# Patient Record
Sex: FEMALE | Race: WHITE | NOT HISPANIC OR LATINO | Employment: OTHER | URBAN - METROPOLITAN AREA
[De-identification: names, ages, dates, MRNs, and addresses within clinical notes are randomized per-mention and may not be internally consistent; named-entity substitution may affect disease eponyms.]

---

## 2018-05-16 ENCOUNTER — TELEPHONE (OUTPATIENT)
Dept: NEUROLOGY | Facility: CLINIC | Age: 63
End: 2018-05-16

## 2018-05-16 NOTE — TELEPHONE ENCOUNTER
Received direct notification via EPIC from [facility] [place], [place] that pt was admitted  Pt last seen by Dr Gaye Van 3/2014  I see pt has appt w/ [facility] [place] [place] Neuro 7/28  Unable to leave message (continuous ringing) to verify if pt requesting to be seen in our office

## 2019-08-03 ENCOUNTER — APPOINTMENT (EMERGENCY)
Dept: RADIOLOGY | Facility: HOSPITAL | Age: 64
End: 2019-08-03
Payer: MEDICARE

## 2019-08-03 ENCOUNTER — HOSPITAL ENCOUNTER (EMERGENCY)
Facility: HOSPITAL | Age: 64
Discharge: HOME | End: 2019-08-03
Attending: EMERGENCY MEDICINE | Admitting: EMERGENCY MEDICINE
Payer: MEDICARE

## 2019-08-03 VITALS
OXYGEN SATURATION: 97 % | BODY MASS INDEX: 19.14 KG/M2 | RESPIRATION RATE: 16 BRPM | WEIGHT: 115 LBS | DIASTOLIC BLOOD PRESSURE: 67 MMHG | HEART RATE: 73 BPM | SYSTOLIC BLOOD PRESSURE: 146 MMHG | TEMPERATURE: 97.4 F

## 2019-08-03 DIAGNOSIS — S97.112A CRUSHING INJURY OF LEFT GREAT TOE, INITIAL ENCOUNTER: Primary | ICD-10-CM

## 2019-08-03 PROCEDURE — 99283 EMERGENCY DEPT VISIT LOW MDM: CPT | Performed by: PHYSICIAN ASSISTANT

## 2019-08-03 PROCEDURE — 73660 X-RAY EXAM OF TOE(S): CPT

## 2019-08-03 PROCEDURE — NS001 PR NO SIGNATURE OR ATTESTATION: Performed by: PODIATRIST

## 2019-08-03 PROCEDURE — 99283 EMERGENCY DEPT VISIT LOW MDM: CPT

## 2019-08-03 PROCEDURE — 90471 IMMUNIZATION ADMIN: CPT

## 2019-08-03 PROCEDURE — 90715 TDAP VACCINE 7 YRS/> IM: CPT | Performed by: PHYSICIAN ASSISTANT

## 2019-08-03 RX ORDER — CEPHALEXIN 500 MG/1
500 CAPSULE ORAL EVERY 6 HOURS SCHEDULED
Qty: 28 CAPSULE | Refills: 0 | Status: SHIPPED | OUTPATIENT
Start: 2019-08-03 | End: 2019-08-10

## 2019-08-03 RX ORDER — BACITRACIN, NEOMYCIN, POLYMYXIN B 400; 3.5; 5 [USP'U]/G; MG/G; [USP'U]/G
1 OINTMENT TOPICAL ONCE
Status: COMPLETED | OUTPATIENT
Start: 2019-08-03 | End: 2019-08-03

## 2019-08-03 RX ORDER — LIDOCAINE HYDROCHLORIDE 10 MG/ML
10 INJECTION, SOLUTION EPIDURAL; INFILTRATION; INTRACAUDAL; PERINEURAL ONCE
Status: COMPLETED | OUTPATIENT
Start: 2019-08-03 | End: 2019-08-03

## 2019-08-03 RX ADMIN — LIDOCAINE HYDROCHLORIDE 10 ML: 10 INJECTION, SOLUTION EPIDURAL; INFILTRATION; INTRACAUDAL; PERINEURAL at 17:42

## 2019-08-03 RX ADMIN — BACITRACIN ZINC, NEOMYCIN, POLYMYXIN B 1 SMALL APPLICATION: 400; 3.5; 5 OINTMENT TOPICAL at 17:42

## 2019-08-03 RX ADMIN — TETANUS TOXOID, REDUCED DIPHTHERIA TOXOID AND ACELLULAR PERTUSSIS VACCINE, ADSORBED 0.5 ML: 5; 2.5; 8; 8; 2.5 SUSPENSION INTRAMUSCULAR at 16:30

## 2019-08-03 NOTE — DISCHARGE INSTRUCTIONS
Nail Removal procedure aftercare:  1) Leave dressings on for 24 hours  2) Soak with epsom salt for 15-20 min per day for 7 days  3) Apply triple antibiotic ointment and bandaid to affected area for 7 days  4) Monitor for infection: pus (thick yellow drainage), redness tracking up the foot, fever, nausea, vomiting, fever, chills   If any of these issues arise, call clinic immediately or go to urgent care or emergency room to see provider

## 2019-08-03 NOTE — CONSULTS
Consult - Podiatry   Sara Burns 70 y o  female MRN: [id number]  Unit/Bed#: ED 02 Encounter: [id number]    Assessment/Plan     Assessment:  1  Contusion of Left hallucal nail with underlying hematoma  2  Onychodystrophy    Plan:  · Sharply debrided toenails x9 using a nail Nipper to normal length and thickness  Debridement was well tolerated without incident  · Performed bedside total nail avulsion of left hallucal nail  Patient was injected with 4 5 cc 1% Lidocaine plain  The entire left hallucal nail was removed from the underlying nail bed using forceps and hemostat without incident  Procedure was tolerated well  · No underlying nail bed laceration noted  · Dressed with triple antibiotic ointment, 4x4 gauze, and Coban  · Recommend PO Keflex 500mg qid for 7 days  · Pain management with OTC NSAIDs PRN pain  · Instructed patient to leave dressing intact for 24 hours  She may then soak the left foot in warm water with Epsom salt for 15 mins  to encourage healthy drainage  Reapply dressing consisting of triple antibiotic and gauze  · Please follow up as outpatient with Dr Albin Gonzalez in 2 weeks  · If any signs of acute infection noted, please return to ED immediately for evaluation  History of Present Illness     HPI:  Sara Burns is a 70 y o  female who presents with pain in the left hallux  She states that her left toe had been stepped on last Thursday leading to bleeding of the hallucal nail plate  She states that she was re-injured and that her children noted the hematoma surrounding the nail plate  She states that her bilateral hallucal nail plates have been dystrophic for a long period of time  Approximately 5 years ago she saw a podiatrist who recommended that she soak her bilateral feet in Listerine to help cure fungus  She was also told that her nails will fall off on their own, but they have not any continue to bother her    She states that when they become increasingly thickened they are susceptible to trauma and getting snagged on clothing articles  She denies experiencing acute signs of infection at the anterior hallucal nail or surrounding soft tissue  She denies nausea, vomiting, fever, chills, shortness of breath, chest pain  She is accompanied by her 2 sons and ambulates with assistance of a wheelchair  Consults  Review of Systems   Constitutional: Negative  HENT: Negative  Eyes: Negative  Respiratory: Negative  Cardiovascular: Negative  Gastrointestinal: Negative  Musculoskeletal:  Bilateral LE muscle weakness  Skin:  Elongated, thickened, dystrophic nails of hallux bilaterally  Neurological: Negative  Psych: Negative  Historical Information   Past Medical History:   Diagnosis Date    Multiple sclerosis ([facility] [address] )      History reviewed  No pertinent surgical history  Social History   Social History     Substance and Sexual Activity   Alcohol Use Not Currently    Frequency: Never     Social History     Substance and Sexual Activity   Drug Use Never     Social History     Tobacco Use   Smoking Status Never Smoker   Smokeless Tobacco Never Used     Family History: History reviewed  No pertinent family history      Meds/Allergies     (Not in a hospital admission)  No Known Allergies    Objective   First Vitals:   Blood Pressure: 146/67 (08/03/19 1518)  Pulse: 73 (08/03/19 1518)  Temperature: (!) 97 4 °F (36 3 °C) (08/03/19 1518)  Temp Source: Oral (08/03/19 1518)  Respirations: 16 (08/03/19 1518)  Weight - Scale: 52 2 kg (115 lb) (08/03/19 1518)  SpO2: 97 % (08/03/19 1518)    Current Vitals:   Blood Pressure: 146/67 (08/03/19 1518)  Pulse: 73 (08/03/19 1518)  Temperature: (!) 97 4 °F (36 3 °C) (08/03/19 1518)  Temp Source: Oral (08/03/19 1518)  Respirations: 16 (08/03/19 1518)  Weight - Scale: 52 2 kg (115 lb) (08/03/19 1518)  SpO2: 97 % (08/03/19 1518)        /67 (BP Location: Right arm)   Pulse 73   Temp (!) 97 4 °F (36 3 °C) (Oral) Resp 16   Wt 52 2 kg (115 lb)   SpO2 97%   BMI 19 14 kg/m²      General Appearance:    Alert, cooperative, no distress   Head:    Normocephalic, without obvious abnormality, atraumatic   Eyes:    PERRL, conjunctiva/corneas clear      Nose:   Moist mucous membranes   Neck:   Supple, symmetrical, trachea midline   Back:     Symmetric   Lungs:     Respirations unlabored   Heart:    Regular rate and rhythm   Abdomen:     Soft, non-tender   Extremities:   MMT is 1/5 to all compartments of the LE, +0/4 edema B/L, No pain on palpation noted bilaterally  No calf tenderness noted bilaterally  Pulses:   Pedal pulses are 2/4 B/l, CFT< 3sec to all digits  Pedal hair is Absent   Skin:   The left hallucal nail is thickened and dystrophic in nature  Hematoma surrounds medial and lateral nail margins  There is onycholysis of the proximal nail plate near the eponychium  There is underlying hematoma on the nail bed present  There is mild tenderness to direct palpation of the left ankle nail plate  The right hallucal nail is elongated, dystrophic, and yellow brown in color  Neurologic:   Gross sensation is Intact  Protective sensation is Intact  Left      Lab, Imaging and other studies:   I have personally reviewed pertinent lab results  , CBC: No results found for: WBC, HGB, HCT, MCV, PLT, ADJUSTEDWBC, MCH, MCHC, RDW, MPV, NRBC, CMP: No results found for: SODIUM, K, CL, CO2, ANIONGAP, BUN, CREATININE, GLUCOSE, CALCIUM, AST, ALT, ALKPHOS, PROT, BILITOT, EGFR      Imaging: I have personally reviewed pertinent films in PACS  EKG, Pathology, and Other Studies: I have personally reviewed pertinent reports  Portions of the record may have been created with voice recognition software  Occasional wrong word or "sound a like" substitutions may have occurred due to the inherent limitations of voice recognition software  Read the chart carefully and recognize, using context, where substitutions have occurred

## 2019-08-03 NOTE — ED PROVIDER NOTES
History  Chief Complaint   Patient presents with    Toe Pain     Pt had an injury to the toe this past Thursday when someone steped on her L great toe  Patient is a 75-year-old female with past medical history of multiple sclerosis who is accompanied to emergency department by her sons for evaluation of toe injury  Patient states that 3 days ago someone stepped on her left great toe  She states that there had been some mild bleeding from under the nail but this resolved  She did not clean out or have the toe scene  She states that she has 8 the come to the house to do range-of-motion exercises and one was there today  She states that her toe was bumped and there had been some mild bleeding from under the nail again  Her sons brought her in for evaluation  She does have a history of a deformed/lifted toe nail to that toe  She had seen Podiatry 2 years ago and they told her that ventrally the nail would fall off and there was nothing they would do at that time  Patient states that the toenail never did fall off  She is not on any blood thinners  Bleeding has stopped  She denies any other injury  She denies significant bony tenderness  She is not ambulatory and is wheelchair-bound  She denies recent fever, chills, nausea vomiting diarrhea, chest pain, shortness breath, abdominal pain, numbness or new weakness  Unknown when her last tetanus shot was  None       Past Medical History:   Diagnosis Date    [facility] )        History reviewed  No pertinent surgical history  History reviewed  No pertinent family history  I have reviewed and agree with the history as documented  Social History     Tobacco Use    Smoking status: Never Smoker    Smokeless tobacco: Never Used   Substance Use Topics    Alcohol use: Not Currently     Frequency: Never    Drug use: Never        Review of Systems   Constitutional: Negative for chills and fever     Respiratory: Negative for shortness of breath  Cardiovascular: Negative for chest pain  Gastrointestinal: Negative for abdominal pain, diarrhea, nausea and vomiting  Musculoskeletal: Negative for arthralgias  Skin: Positive for wound  Neurological: Negative for weakness and numbness  All other systems reviewed and are negative  Physical Exam  Physical Exam   Constitutional: She appears well-developed and well-nourished  No distress  HENT:   Head: Normocephalic and atraumatic  Right Ear: External ear normal    Left Ear: External ear normal    Nose: Nose normal    Eyes: EOM are normal    Cardiovascular: Normal rate, regular rhythm and normal heart sounds  Exam reveals no gallop and no friction rub  No murmur heard  Pulmonary/Chest: Effort normal and breath sounds normal  No stridor  No respiratory distress  She has no wheezes  Musculoskeletal:   Patient in wheelchair  Left great toe with chronically deformed nail/onychodystrophy  There is dried blood noted to the lateral and proximal aspect without obvious laceration  Proximal aspect of nail slightly loose at nail fold  No active bleeding  Patient with decreased ROM/strength which is normal d/t her MS  Sensation intact  Capillary refill intact  Pedal pulse intact  See image in media section    Neurological: She is alert  Skin: Skin is warm  Capillary refill takes less than 2 seconds  She is not diaphoretic  Psychiatric: She has a normal mood and affect  Her behavior is normal    Nursing note and vitals reviewed        Vital Signs  ED Triage Vitals [08/03/19 1518]   Temperature Pulse Respirations Blood Pressure SpO2   (!) 97 4 °F (36 3 °C) 73 16 146/67 97 %      Temp Source Heart Rate Source Patient Position - Orthostatic VS BP Location FiO2 (%)   Oral -- Sitting Right arm --      Pain Score       No Pain           Vitals:    08/03/19 1518   BP: 146/67   Pulse: 73   Patient Position - Orthostatic VS: Sitting         Visual Acuity      ED Medications  Medications   tetanus-diphtheria-acellular pertussis (BOOSTRIX) IM injection 0 5 mL (0 5 mL Intramuscular Given 8/3/19 1630)   lidocaine (PF) (XYLOCAINE-MPF) 1 % injection 10 mL (10 mL Infiltration Given 8/3/19 1742)   neomycin-bacitracin-polymyxin b (NEOSPORIN) ointment 1 small application (1 small application Topical Given 8/3/19 1742)       Diagnostic Studies  Results Reviewed     None                 XR toe great min 2 views LEFT    (Results Pending)              Procedures  Procedures       ED Course                               MDM  Number of Diagnoses or Management Options  Crushing injury of left great toe, initial encounter:   Diagnosis management comments: Plan- will xray and give boostrix  Xray reviewed by me and interpreted as no acute bony abnormality  Discussed with Podiatry Resident Geena Singh who will come to see the patient  Dr Courtney Alcazar discussed with attending and will remove the nail and apply dressing, see separate note/procedure note  Will cover with Keflex 500mg QID x 7 days  Instructed to follow up with podiatry and follow wound care as directed by podiatry  Return to the ED if symptoms worsen or new symptoms arise  Patient states understanding and agrees with plan          Amount and/or Complexity of Data Reviewed  Tests in the radiology section of CPT®: ordered and reviewed  Discuss the patient with other providers: yes  Independent visualization of images, tracings, or specimens: yes    Patient Progress  Patient progress: stable      Disposition  Final diagnoses:   Crushing injury of left great toe, initial encounter     Time reflects when diagnosis was documented in both MDM as applicable and the Disposition within this note     Time User Action Codes Description Comment    8/3/2019  5:00 PM Zohaib Rocha Add [[id number]] Crushing injury of left great toe, initial encounter       ED Disposition     ED Disposition Condition Date/Time Comment    Discharge Stable Sat Aug 3, 2019  5:39 PM Emily Corbett discharge to home/self care  Follow-up Information     Follow up With Specialties Details Why Contact Info Additional Information    Richi Machado,  Family Medicine Schedule an appointment as soon as possible for a visit in 1 day  [facility]  [address] [place] [address]       Fredrick Sparks, [facility], Wound Care Schedule an appointment as soon as possible for a visit in 2 days call to schedule an appointment for follow up [address] [facility] [address] [facility] Emergency Department Emergency Medicine  If symptoms worsen [facility] [id number]  [phone] [place] ED, [address] [facility] , [place], [place], [address]          Discharge Medication List as of 8/3/2019  5:41 PM      START taking these medications    Details   cephalexin (KEFLEX) 500 mg capsule Take 1 capsule (500 mg total) by mouth every 6 (six) hours for 7 days, Starting Sat 8/3/2019, Until Sat 8/10/2019, Print           No discharge procedures on file      ED Provider  Electronically Signed by           Gurmeet Leiva PA-C  08/03/19 1939